# Patient Record
Sex: FEMALE | Race: WHITE | NOT HISPANIC OR LATINO | Employment: FULL TIME | URBAN - METROPOLITAN AREA
[De-identification: names, ages, dates, MRNs, and addresses within clinical notes are randomized per-mention and may not be internally consistent; named-entity substitution may affect disease eponyms.]

---

## 2020-10-20 ENCOUNTER — OFFICE VISIT (OUTPATIENT)
Dept: OBSTETRICS AND GYNECOLOGY | Facility: CLINIC | Age: 25
End: 2020-10-20
Payer: COMMERCIAL

## 2020-10-20 VITALS
WEIGHT: 247.81 LBS | BODY MASS INDEX: 38.89 KG/M2 | HEIGHT: 67 IN | DIASTOLIC BLOOD PRESSURE: 64 MMHG | SYSTOLIC BLOOD PRESSURE: 110 MMHG

## 2020-10-20 DIAGNOSIS — N76.4 VULVAR ABSCESS: Primary | ICD-10-CM

## 2020-10-20 PROCEDURE — 99999 PR PBB SHADOW E&M-NEW PATIENT-LVL III: CPT | Mod: PBBFAC,,, | Performed by: OBSTETRICS & GYNECOLOGY

## 2020-10-20 PROCEDURE — 99203 OFFICE O/P NEW LOW 30 MIN: CPT | Mod: S$GLB,,, | Performed by: OBSTETRICS & GYNECOLOGY

## 2020-10-20 PROCEDURE — 99203 PR OFFICE/OUTPT VISIT, NEW, LEVL III, 30-44 MIN: ICD-10-PCS | Mod: S$GLB,,, | Performed by: OBSTETRICS & GYNECOLOGY

## 2020-10-20 PROCEDURE — 99999 PR PBB SHADOW E&M-NEW PATIENT-LVL III: ICD-10-PCS | Mod: PBBFAC,,, | Performed by: OBSTETRICS & GYNECOLOGY

## 2020-10-20 RX ORDER — TOPIRAMATE 50 MG/1
TABLET, FILM COATED ORAL
COMMUNITY
End: 2021-03-09

## 2020-10-20 NOTE — PROGRESS NOTES
"Subjective:       Madyson Hodge is a 25 y.o. female who presents for follow up from ER visit.   She was seen in the ER for bilateral vaginal cyst/abscess. She is s/p I&D with word cathter placement. She is currently on Bactrim. She admits to vulvar pain controlled with medication.   She desires surgical management for her recurrent Bartholin cyst. States this is the 3rd time she has had an I&D.     The following portions of the patient's history were reviewed and updated as appropriate: allergies, current medications, past family history, past medical history, past social history, past surgical history and problem list.    Review of Systems  Constitutional: negative for fevers  Gastrointestinal: negative for abdominal pain, constipation, diarrhea, nausea and vomiting  Genitourinary:negative for abnormal menstrual periods and vaginal discharge, dysuria and frequency      Objective:      /64   Ht 5' 7" (1.702 m)   Wt 112.4 kg (247 lb 12.8 oz)   LMP 10/01/2020   BMI 38.81 kg/m²    Physical Exam:               Genitourinary:          Pelvic exam was performed with patient supine.   There is tenderness and lesion on the right labia. There is tenderness and lesion on the left labia. Labial bartholins normal.   Genitourinary Comments: Right inferior Labia Major with 2cm swelling with words cathter noted, no surrounding erythema, slight tenderness, slight amount of serous drainage with cathter removal.     Left Superior Labia Majora 1cm swelling with words cathter noted, no surrounding erythema, slight tenderness, no drainage with cathter removal    Both locations are not the typical location for a Bartholin's cyst. They are on the labia mojora, hair baring area. The introitus was normal, with slight swelling in left Bartholin's area.                           Assessment:     Vulvar abscess s/p I&D  Word catheters removed.      Plan:      Suspected vulvar abscess, s/p I&D. Area does not appear infected. " Continue antibiotic course. Refrain from shaving. Keep area clean.   Return to clinic if no improvement.

## 2020-12-03 ENCOUNTER — OFFICE VISIT (OUTPATIENT)
Dept: OBSTETRICS AND GYNECOLOGY | Facility: CLINIC | Age: 25
End: 2020-12-03
Payer: COMMERCIAL

## 2020-12-03 VITALS
WEIGHT: 258.81 LBS | HEIGHT: 67 IN | DIASTOLIC BLOOD PRESSURE: 66 MMHG | BODY MASS INDEX: 40.62 KG/M2 | SYSTOLIC BLOOD PRESSURE: 112 MMHG

## 2020-12-03 DIAGNOSIS — N90.7 VULVAR CYSTS: Primary | ICD-10-CM

## 2020-12-03 PROCEDURE — 99213 PR OFFICE/OUTPT VISIT, EST, LEVL III, 20-29 MIN: ICD-10-PCS | Mod: S$GLB,,, | Performed by: OBSTETRICS & GYNECOLOGY

## 2020-12-03 PROCEDURE — 99213 OFFICE O/P EST LOW 20 MIN: CPT | Mod: S$GLB,,, | Performed by: OBSTETRICS & GYNECOLOGY

## 2020-12-03 PROCEDURE — 99999 PR PBB SHADOW E&M-EST. PATIENT-LVL III: ICD-10-PCS | Mod: PBBFAC,,, | Performed by: OBSTETRICS & GYNECOLOGY

## 2020-12-03 PROCEDURE — 99999 PR PBB SHADOW E&M-EST. PATIENT-LVL III: CPT | Mod: PBBFAC,,, | Performed by: OBSTETRICS & GYNECOLOGY

## 2020-12-03 NOTE — PROGRESS NOTES
"Subjective:       Madyson Hodge is a 25 y.o. female who presents for follow up for recurrent vulvar cyst.  She has been seen in the ER for bilateral vaginal cyst/abscess. She is s/p several I&D with word cathter placement in the past.   At last visit, I did not feel the location was consistent with Batholin's cyst and removed the word cathter.   Patient states the cyst resolved but have now returned.   Slight discomfort. Denies fever. Denies drainage.     The following portions of the patient's history were reviewed and updated as appropriate: allergies, current medications, past family history, past medical history, past social history, past surgical history and problem list.    Review of Systems  Constitutional: negative for fevers  Gastrointestinal: negative for abdominal pain, constipation, diarrhea, nausea and vomiting  Genitourinary:negative for abnormal menstrual periods and vaginal discharge, dysuria and frequency      Objective:      /66   Ht 5' 7" (1.702 m)   Wt 117.4 kg (258 lb 13.1 oz)   BMI 40.54 kg/m²      Physical Exam:               Genitourinary:                           Assessment:     Vulvar cysts     Plan:     Vulvar cysts  -     Ambulatory referral/consult to Urogynecology; Future; Expected date: 12/10/2020  -     CT Pelvis W Wo  Contrast; Future; Expected date: 12/03/2020    Refrain from shaving. Keep area clean.   DDx: atypical bartholin's, vulvar hernia, other anomaly, vs abscess  Will get CT scan and refer to UroGyn for further evaluation.       "

## 2021-03-02 ENCOUNTER — TELEPHONE (OUTPATIENT)
Dept: NEUROLOGY | Facility: CLINIC | Age: 26
End: 2021-03-02

## 2021-03-09 ENCOUNTER — TELEPHONE (OUTPATIENT)
Dept: NEUROLOGY | Facility: CLINIC | Age: 26
End: 2021-03-09

## 2021-03-09 ENCOUNTER — OFFICE VISIT (OUTPATIENT)
Dept: NEUROLOGY | Facility: CLINIC | Age: 26
End: 2021-03-09
Payer: COMMERCIAL

## 2021-03-09 VITALS
BODY MASS INDEX: 44.91 KG/M2 | WEIGHT: 279.44 LBS | RESPIRATION RATE: 20 BRPM | SYSTOLIC BLOOD PRESSURE: 120 MMHG | DIASTOLIC BLOOD PRESSURE: 73 MMHG | TEMPERATURE: 98 F | HEART RATE: 70 BPM | HEIGHT: 66 IN

## 2021-03-09 DIAGNOSIS — G40.209 LOCALIZATION-RELATED (FOCAL) (PARTIAL) SYMPTOMATIC EPILEPSY AND EPILEPTIC SYNDROMES WITH COMPLEX PARTIAL SEIZURES, NOT INTRACTABLE, WITHOUT STATUS EPILEPTICUS: Primary | ICD-10-CM

## 2021-03-09 DIAGNOSIS — G93.9 BRAIN LESION: ICD-10-CM

## 2021-03-09 PROCEDURE — 99999 PR PBB SHADOW E&M-EST. PATIENT-LVL IV: ICD-10-PCS | Mod: PBBFAC,,, | Performed by: PSYCHIATRY & NEUROLOGY

## 2021-03-09 PROCEDURE — 99205 PR OFFICE/OUTPT VISIT, NEW, LEVL V, 60-74 MIN: ICD-10-PCS | Mod: S$GLB,,, | Performed by: PSYCHIATRY & NEUROLOGY

## 2021-03-09 PROCEDURE — 99205 OFFICE O/P NEW HI 60 MIN: CPT | Mod: S$GLB,,, | Performed by: PSYCHIATRY & NEUROLOGY

## 2021-03-09 PROCEDURE — 99999 PR PBB SHADOW E&M-EST. PATIENT-LVL IV: CPT | Mod: PBBFAC,,, | Performed by: PSYCHIATRY & NEUROLOGY

## 2021-03-09 RX ORDER — ZONISAMIDE 100 MG/1
400 CAPSULE ORAL NIGHTLY
Qty: 120 CAPSULE | Refills: 11 | Status: SHIPPED | OUTPATIENT
Start: 2021-03-09 | End: 2021-03-26

## 2021-03-17 ENCOUNTER — TELEPHONE (OUTPATIENT)
Dept: NEUROLOGY | Facility: CLINIC | Age: 26
End: 2021-03-17

## 2021-03-18 ENCOUNTER — TELEPHONE (OUTPATIENT)
Dept: NEUROLOGY | Facility: CLINIC | Age: 26
End: 2021-03-18

## 2021-03-25 ENCOUNTER — TELEPHONE (OUTPATIENT)
Dept: NEUROLOGY | Facility: CLINIC | Age: 26
End: 2021-03-25

## 2021-03-25 DIAGNOSIS — G40.209 LOCALIZATION-RELATED (FOCAL) (PARTIAL) SYMPTOMATIC EPILEPSY AND EPILEPTIC SYNDROMES WITH COMPLEX PARTIAL SEIZURES, NOT INTRACTABLE, WITHOUT STATUS EPILEPTICUS: Primary | ICD-10-CM

## 2021-03-26 RX ORDER — LAMOTRIGINE 100 MG/1
100 TABLET ORAL 2 TIMES DAILY
Qty: 60 TABLET | Refills: 11 | Status: SHIPPED | OUTPATIENT
Start: 2021-03-26 | End: 2022-01-06 | Stop reason: SDUPTHER

## 2021-03-26 RX ORDER — LAMOTRIGINE 25 MG/1
TABLET ORAL
Qty: 168 TABLET | Refills: 0 | Status: SHIPPED | OUTPATIENT
Start: 2021-03-26 | End: 2021-09-15

## 2021-03-31 ENCOUNTER — TELEPHONE (OUTPATIENT)
Dept: NEUROLOGY | Facility: CLINIC | Age: 26
End: 2021-03-31

## 2021-04-06 ENCOUNTER — TELEPHONE (OUTPATIENT)
Dept: NEUROLOGY | Facility: CLINIC | Age: 26
End: 2021-04-06

## 2021-04-07 ENCOUNTER — TELEPHONE (OUTPATIENT)
Dept: NEUROLOGY | Facility: CLINIC | Age: 26
End: 2021-04-07

## 2021-04-08 ENCOUNTER — TELEPHONE (OUTPATIENT)
Dept: NEUROLOGY | Facility: CLINIC | Age: 26
End: 2021-04-08

## 2021-04-27 ENCOUNTER — OFFICE VISIT (OUTPATIENT)
Dept: OBSTETRICS AND GYNECOLOGY | Facility: CLINIC | Age: 26
End: 2021-04-27
Payer: COMMERCIAL

## 2021-04-27 VITALS
SYSTOLIC BLOOD PRESSURE: 94 MMHG | BODY MASS INDEX: 44.72 KG/M2 | WEIGHT: 278.25 LBS | DIASTOLIC BLOOD PRESSURE: 60 MMHG | HEIGHT: 66 IN

## 2021-04-27 DIAGNOSIS — Z87.42 HISTORY OF OVARIAN CYST: Primary | ICD-10-CM

## 2021-04-27 DIAGNOSIS — Z31.89 ENCOUNTER FOR FERTILITY PLANNING: ICD-10-CM

## 2021-04-27 PROCEDURE — 99999 PR PBB SHADOW E&M-EST. PATIENT-LVL III: CPT | Mod: PBBFAC,,, | Performed by: OBSTETRICS & GYNECOLOGY

## 2021-04-27 PROCEDURE — 99213 OFFICE O/P EST LOW 20 MIN: CPT | Mod: S$GLB,,, | Performed by: OBSTETRICS & GYNECOLOGY

## 2021-04-27 PROCEDURE — 99999 PR PBB SHADOW E&M-EST. PATIENT-LVL III: ICD-10-PCS | Mod: PBBFAC,,, | Performed by: OBSTETRICS & GYNECOLOGY

## 2021-04-27 PROCEDURE — 99213 PR OFFICE/OUTPT VISIT, EST, LEVL III, 20-29 MIN: ICD-10-PCS | Mod: S$GLB,,, | Performed by: OBSTETRICS & GYNECOLOGY

## 2021-04-30 ENCOUNTER — TELEPHONE (OUTPATIENT)
Dept: NEUROLOGY | Facility: CLINIC | Age: 26
End: 2021-04-30

## 2021-04-30 DIAGNOSIS — C71.9 LOW GRADE GLIOMA OF BRAIN: ICD-10-CM

## 2021-04-30 DIAGNOSIS — D49.6 BRAIN TUMOR: Primary | ICD-10-CM

## 2021-05-10 ENCOUNTER — PATIENT MESSAGE (OUTPATIENT)
Dept: RESEARCH | Facility: HOSPITAL | Age: 26
End: 2021-05-10

## 2021-05-27 ENCOUNTER — TELEPHONE (OUTPATIENT)
Dept: NEUROLOGY | Facility: CLINIC | Age: 26
End: 2021-05-27

## 2021-05-28 ENCOUNTER — TELEPHONE (OUTPATIENT)
Dept: NEUROLOGY | Facility: CLINIC | Age: 26
End: 2021-05-28

## 2021-05-28 NOTE — TELEPHONE ENCOUNTER
Spoke with Lauren at [facility] and requested patient MRI disk and report. I faxed over a medical release with Dr. Boateng name and address on it so the disk and report can be sent to us.

## 2021-06-09 ENCOUNTER — TELEPHONE (OUTPATIENT)
Dept: RADIOLOGY | Facility: HOSPITAL | Age: 26
End: 2021-06-09

## 2021-09-15 ENCOUNTER — OFFICE VISIT (OUTPATIENT)
Dept: NEUROLOGY | Facility: CLINIC | Age: 26
End: 2021-09-15
Payer: COMMERCIAL

## 2021-09-15 ENCOUNTER — TELEPHONE (OUTPATIENT)
Dept: NEUROLOGY | Facility: CLINIC | Age: 26
End: 2021-09-15

## 2021-09-15 VITALS
RESPIRATION RATE: 20 BRPM | TEMPERATURE: 98 F | HEIGHT: 66 IN | SYSTOLIC BLOOD PRESSURE: 114 MMHG | HEART RATE: 68 BPM | DIASTOLIC BLOOD PRESSURE: 79 MMHG | BODY MASS INDEX: 43.38 KG/M2 | WEIGHT: 269.94 LBS

## 2021-09-15 DIAGNOSIS — C71.9 LOW GRADE GLIOMA OF BRAIN: ICD-10-CM

## 2021-09-15 DIAGNOSIS — G40.209 LOCALIZATION-RELATED (FOCAL) (PARTIAL) SYMPTOMATIC EPILEPSY AND EPILEPTIC SYNDROMES WITH COMPLEX PARTIAL SEIZURES, NOT INTRACTABLE, WITHOUT STATUS EPILEPTICUS: Primary | ICD-10-CM

## 2021-09-15 PROCEDURE — 99999 PR PBB SHADOW E&M-EST. PATIENT-LVL IV: ICD-10-PCS | Mod: PBBFAC,,, | Performed by: PSYCHIATRY & NEUROLOGY

## 2021-09-15 PROCEDURE — 99999 PR PBB SHADOW E&M-EST. PATIENT-LVL IV: CPT | Mod: PBBFAC,,, | Performed by: PSYCHIATRY & NEUROLOGY

## 2021-09-15 PROCEDURE — 99215 PR OFFICE/OUTPT VISIT, EST, LEVL V, 40-54 MIN: ICD-10-PCS | Mod: S$GLB,,, | Performed by: PSYCHIATRY & NEUROLOGY

## 2021-09-15 PROCEDURE — 99215 OFFICE O/P EST HI 40 MIN: CPT | Mod: S$GLB,,, | Performed by: PSYCHIATRY & NEUROLOGY

## 2021-09-15 RX ORDER — GABAPENTIN 300 MG/1
300 CAPSULE ORAL DAILY
Qty: 30 CAPSULE | Refills: 11 | Status: SHIPPED | OUTPATIENT
Start: 2021-09-15 | End: 2022-01-06 | Stop reason: SDUPTHER

## 2021-11-09 ENCOUNTER — TELEPHONE (OUTPATIENT)
Dept: NEUROLOGY | Facility: CLINIC | Age: 26
End: 2021-11-09
Payer: MEDICAID

## 2021-11-09 ENCOUNTER — PATIENT MESSAGE (OUTPATIENT)
Dept: NEUROLOGY | Facility: CLINIC | Age: 26
End: 2021-11-09
Payer: MEDICAID

## 2021-12-10 ENCOUNTER — TELEPHONE (OUTPATIENT)
Dept: NEUROLOGY | Facility: CLINIC | Age: 26
End: 2021-12-10
Payer: MEDICAID

## 2021-12-10 ENCOUNTER — PATIENT MESSAGE (OUTPATIENT)
Dept: NEUROLOGY | Facility: CLINIC | Age: 26
End: 2021-12-10
Payer: MEDICAID

## 2022-01-06 ENCOUNTER — OFFICE VISIT (OUTPATIENT)
Dept: NEUROLOGY | Facility: CLINIC | Age: 27
End: 2022-01-06
Payer: MEDICAID

## 2022-01-06 VITALS
WEIGHT: 258.19 LBS | DIASTOLIC BLOOD PRESSURE: 58 MMHG | RESPIRATION RATE: 18 BRPM | HEART RATE: 72 BPM | TEMPERATURE: 98 F | BODY MASS INDEX: 41.67 KG/M2 | SYSTOLIC BLOOD PRESSURE: 110 MMHG

## 2022-01-06 DIAGNOSIS — G40.209 LOCALIZATION-RELATED (FOCAL) (PARTIAL) SYMPTOMATIC EPILEPSY AND EPILEPTIC SYNDROMES WITH COMPLEX PARTIAL SEIZURES, NOT INTRACTABLE, WITHOUT STATUS EPILEPTICUS: Primary | ICD-10-CM

## 2022-01-06 DIAGNOSIS — C71.9 LOW GRADE GLIOMA OF BRAIN: ICD-10-CM

## 2022-01-06 PROCEDURE — 99999 PR PBB SHADOW E&M-EST. PATIENT-LVL IV: ICD-10-PCS | Mod: PBBFAC,,, | Performed by: PSYCHIATRY & NEUROLOGY

## 2022-01-06 PROCEDURE — 99214 OFFICE O/P EST MOD 30 MIN: CPT | Mod: PBBFAC,PO | Performed by: PSYCHIATRY & NEUROLOGY

## 2022-01-06 PROCEDURE — 3078F DIAST BP <80 MM HG: CPT | Mod: CPTII,,, | Performed by: PSYCHIATRY & NEUROLOGY

## 2022-01-06 PROCEDURE — 99215 PR OFFICE/OUTPT VISIT, EST, LEVL V, 40-54 MIN: ICD-10-PCS | Mod: S$PBB,,, | Performed by: PSYCHIATRY & NEUROLOGY

## 2022-01-06 PROCEDURE — 3008F BODY MASS INDEX DOCD: CPT | Mod: CPTII,,, | Performed by: PSYCHIATRY & NEUROLOGY

## 2022-01-06 PROCEDURE — 99999 PR PBB SHADOW E&M-EST. PATIENT-LVL IV: CPT | Mod: PBBFAC,,, | Performed by: PSYCHIATRY & NEUROLOGY

## 2022-01-06 PROCEDURE — 1159F PR MEDICATION LIST DOCUMENTED IN MEDICAL RECORD: ICD-10-PCS | Mod: CPTII,,, | Performed by: PSYCHIATRY & NEUROLOGY

## 2022-01-06 PROCEDURE — 3078F PR MOST RECENT DIASTOLIC BLOOD PRESSURE < 80 MM HG: ICD-10-PCS | Mod: CPTII,,, | Performed by: PSYCHIATRY & NEUROLOGY

## 2022-01-06 PROCEDURE — 1159F MED LIST DOCD IN RCRD: CPT | Mod: CPTII,,, | Performed by: PSYCHIATRY & NEUROLOGY

## 2022-01-06 PROCEDURE — 3008F PR BODY MASS INDEX (BMI) DOCUMENTED: ICD-10-PCS | Mod: CPTII,,, | Performed by: PSYCHIATRY & NEUROLOGY

## 2022-01-06 PROCEDURE — 1160F RVW MEDS BY RX/DR IN RCRD: CPT | Mod: CPTII,,, | Performed by: PSYCHIATRY & NEUROLOGY

## 2022-01-06 PROCEDURE — 99215 OFFICE O/P EST HI 40 MIN: CPT | Mod: S$PBB,,, | Performed by: PSYCHIATRY & NEUROLOGY

## 2022-01-06 PROCEDURE — 3074F PR MOST RECENT SYSTOLIC BLOOD PRESSURE < 130 MM HG: ICD-10-PCS | Mod: CPTII,,, | Performed by: PSYCHIATRY & NEUROLOGY

## 2022-01-06 PROCEDURE — 1160F PR REVIEW ALL MEDS BY PRESCRIBER/CLIN PHARMACIST DOCUMENTED: ICD-10-PCS | Mod: CPTII,,, | Performed by: PSYCHIATRY & NEUROLOGY

## 2022-01-06 PROCEDURE — 3074F SYST BP LT 130 MM HG: CPT | Mod: CPTII,,, | Performed by: PSYCHIATRY & NEUROLOGY

## 2022-01-06 RX ORDER — LAMOTRIGINE 200 MG/1
200 TABLET ORAL 2 TIMES DAILY
Qty: 60 TABLET | Refills: 11 | Status: SHIPPED | OUTPATIENT
Start: 2022-01-06 | End: 2022-07-06 | Stop reason: SDUPTHER

## 2022-01-06 RX ORDER — GABAPENTIN 300 MG/1
CAPSULE ORAL
Qty: 90 CAPSULE | Refills: 11 | Status: SHIPPED | OUTPATIENT
Start: 2022-01-06 | End: 2022-07-06 | Stop reason: SDUPTHER
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
## 2022-01-13 ENCOUNTER — LAB VISIT (OUTPATIENT)
Dept: LAB | Facility: HOSPITAL | Age: 27
End: 2022-01-13
Attending: PSYCHIATRY & NEUROLOGY
Payer: MEDICAID

## 2022-01-13 ENCOUNTER — OFFICE VISIT (OUTPATIENT)
Dept: NEUROLOGY | Facility: CLINIC | Age: 27
End: 2022-01-13
Payer: MEDICAID

## 2022-01-13 VITALS
WEIGHT: 273.13 LBS | HEART RATE: 62 BPM | SYSTOLIC BLOOD PRESSURE: 114 MMHG | BODY MASS INDEX: 43.9 KG/M2 | RESPIRATION RATE: 17 BRPM | HEIGHT: 66 IN | DIASTOLIC BLOOD PRESSURE: 74 MMHG | TEMPERATURE: 99 F

## 2022-01-13 DIAGNOSIS — C71.9 LOW GRADE GLIOMA OF BRAIN: ICD-10-CM

## 2022-01-13 DIAGNOSIS — G44.40 REBOUND HEADACHE: ICD-10-CM

## 2022-01-13 DIAGNOSIS — G47.9 TROUBLE IN SLEEPING: ICD-10-CM

## 2022-01-13 DIAGNOSIS — M79.18 MYOFASCIAL PAIN: ICD-10-CM

## 2022-01-13 DIAGNOSIS — G43.709 CHRONIC MIGRAINE WITHOUT AURA WITHOUT STATUS MIGRAINOSUS, NOT INTRACTABLE: Primary | ICD-10-CM

## 2022-01-13 DIAGNOSIS — G40.209 LOCALIZATION-RELATED (FOCAL) (PARTIAL) SYMPTOMATIC EPILEPSY AND EPILEPTIC SYNDROMES WITH COMPLEX PARTIAL SEIZURES, NOT INTRACTABLE, WITHOUT STATUS EPILEPTICUS: ICD-10-CM

## 2022-01-13 LAB
ALBUMIN SERPL BCP-MCNC: 4 G/DL (ref 3.5–5.2)
ALP SERPL-CCNC: 102 U/L (ref 55–135)
ALT SERPL W/O P-5'-P-CCNC: 11 U/L (ref 10–44)
ANION GAP SERPL CALC-SCNC: 6 MMOL/L (ref 8–16)
AST SERPL-CCNC: 15 U/L (ref 10–40)
BILIRUB SERPL-MCNC: 0.7 MG/DL (ref 0.1–1)
BUN SERPL-MCNC: 15 MG/DL (ref 6–20)
CALCIUM SERPL-MCNC: 10 MG/DL (ref 8.7–10.5)
CHLORIDE SERPL-SCNC: 108 MMOL/L (ref 95–110)
CO2 SERPL-SCNC: 24 MMOL/L (ref 23–29)
CREAT SERPL-MCNC: 0.8 MG/DL (ref 0.5–1.4)
EST. GFR  (AFRICAN AMERICAN): >60 ML/MIN/1.73 M^2
EST. GFR  (NON AFRICAN AMERICAN): >60 ML/MIN/1.73 M^2
GLUCOSE SERPL-MCNC: 85 MG/DL (ref 70–110)
POTASSIUM SERPL-SCNC: 4.6 MMOL/L (ref 3.5–5.1)
PROT SERPL-MCNC: 7.2 G/DL (ref 6–8.4)
SODIUM SERPL-SCNC: 138 MMOL/L (ref 136–145)

## 2022-01-13 PROCEDURE — 1159F PR MEDICATION LIST DOCUMENTED IN MEDICAL RECORD: ICD-10-PCS | Mod: CPTII,,, | Performed by: PHYSICIAN ASSISTANT

## 2022-01-13 PROCEDURE — 3074F SYST BP LT 130 MM HG: CPT | Mod: CPTII,,, | Performed by: PHYSICIAN ASSISTANT

## 2022-01-13 PROCEDURE — 3008F BODY MASS INDEX DOCD: CPT | Mod: CPTII,,, | Performed by: PHYSICIAN ASSISTANT

## 2022-01-13 PROCEDURE — 36415 COLL VENOUS BLD VENIPUNCTURE: CPT | Mod: PO | Performed by: PSYCHIATRY & NEUROLOGY

## 2022-01-13 PROCEDURE — 3074F PR MOST RECENT SYSTOLIC BLOOD PRESSURE < 130 MM HG: ICD-10-PCS | Mod: CPTII,,, | Performed by: PHYSICIAN ASSISTANT

## 2022-01-13 PROCEDURE — 99215 OFFICE O/P EST HI 40 MIN: CPT | Mod: S$PBB,,, | Performed by: PHYSICIAN ASSISTANT

## 2022-01-13 PROCEDURE — 3008F PR BODY MASS INDEX (BMI) DOCUMENTED: ICD-10-PCS | Mod: CPTII,,, | Performed by: PHYSICIAN ASSISTANT

## 2022-01-13 PROCEDURE — 1159F MED LIST DOCD IN RCRD: CPT | Mod: CPTII,,, | Performed by: PHYSICIAN ASSISTANT

## 2022-01-13 PROCEDURE — 80053 COMPREHEN METABOLIC PANEL: CPT | Performed by: PSYCHIATRY & NEUROLOGY

## 2022-01-13 PROCEDURE — 99999 PR PBB SHADOW E&M-EST. PATIENT-LVL IV: CPT | Mod: PBBFAC,,, | Performed by: PHYSICIAN ASSISTANT

## 2022-01-13 PROCEDURE — 99214 OFFICE O/P EST MOD 30 MIN: CPT | Mod: PBBFAC,PO | Performed by: PHYSICIAN ASSISTANT

## 2022-01-13 PROCEDURE — 1160F RVW MEDS BY RX/DR IN RCRD: CPT | Mod: CPTII,,, | Performed by: PHYSICIAN ASSISTANT

## 2022-01-13 PROCEDURE — 99215 PR OFFICE/OUTPT VISIT, EST, LEVL V, 40-54 MIN: ICD-10-PCS | Mod: S$PBB,,, | Performed by: PHYSICIAN ASSISTANT

## 2022-01-13 PROCEDURE — 3078F DIAST BP <80 MM HG: CPT | Mod: CPTII,,, | Performed by: PHYSICIAN ASSISTANT

## 2022-01-13 PROCEDURE — 80175 DRUG SCREEN QUAN LAMOTRIGINE: CPT | Performed by: PSYCHIATRY & NEUROLOGY

## 2022-01-13 PROCEDURE — 99999 PR PBB SHADOW E&M-EST. PATIENT-LVL IV: ICD-10-PCS | Mod: PBBFAC,,, | Performed by: PHYSICIAN ASSISTANT

## 2022-01-13 PROCEDURE — 3078F PR MOST RECENT DIASTOLIC BLOOD PRESSURE < 80 MM HG: ICD-10-PCS | Mod: CPTII,,, | Performed by: PHYSICIAN ASSISTANT

## 2022-01-13 PROCEDURE — 1160F PR REVIEW ALL MEDS BY PRESCRIBER/CLIN PHARMACIST DOCUMENTED: ICD-10-PCS | Mod: CPTII,,, | Performed by: PHYSICIAN ASSISTANT

## 2022-01-13 RX ORDER — TIZANIDINE 2 MG/1
TABLET ORAL
Qty: 60 TABLET | Refills: 4 | Status: SHIPPED | OUTPATIENT
Start: 2022-01-13 | End: 2023-09-15

## 2022-01-13 RX ORDER — IBUPROFEN 100 MG/1
TABLET, CHEWABLE ORAL
COMMUNITY
End: 2023-09-15

## 2022-01-13 RX ORDER — HYDROXYZINE PAMOATE 25 MG/1
CAPSULE ORAL
Qty: 15 CAPSULE | Refills: 1 | Status: SHIPPED | OUTPATIENT
Start: 2022-01-13 | End: 2023-09-15

## 2022-01-13 NOTE — PATIENT INSTRUCTIONS
"      To reduce headaches:  PT ordered  Try the zanaflex (tizanadine) 1 pill approx 2 hours before bed for a week, if after a week, no benefits/no side effects, move up to 2 pills    For headache :  Limit over the counter medicine to <3 days use in week ("white rabbit")    To "factory reset"  Try the vistaril (hydroxyzine) 1 pill 3x a day for 2 days, rest left over every 8 hours as needed for bad headaches, can cause sedation/no driving     "

## 2022-01-14 ENCOUNTER — TELEPHONE (OUTPATIENT)
Dept: NEUROLOGY | Facility: CLINIC | Age: 27
End: 2022-01-14
Payer: MEDICAID

## 2022-01-14 NOTE — TELEPHONE ENCOUNTER
Call returned to Pt to in regards to EEG. Pt states insurance is is still denying payment for it but she has gotten in touch with payment assistance and they will help her to cover the cost. Procedure has been rescheduled but will possibly be rescheduled again d/t her daughter having covid.

## 2022-01-14 NOTE — TELEPHONE ENCOUNTER
----- Message from Marisela Trevino sent at 1/14/2022  2:00 PM CST -----  Regarding: advice  Contact: patient  Type: Needs Medical Advice  Who Called:  patient  Best Call Back Number: [phone] (home)   Additional Information: Patient is trying to get the EEG but insurance is not approving it. Please call patient to advise. Thanks!

## 2022-01-14 NOTE — TELEPHONE ENCOUNTER
Call placed to Pt to advise that EEG scheduled for 01/17/22 @ 9am is not covered by insurance.  Pt informed that she can request self pay for assistance with covering the cost. Number provided to [facility] sleep study and [facility] billing. Pt verbalized understanding.

## 2022-01-17 LAB — LAMOTRIGINE SERPL-MCNC: 5.2 UG/ML (ref 2–15)

## 2022-01-18 ENCOUNTER — TELEPHONE (OUTPATIENT)
Dept: NEUROLOGY | Facility: CLINIC | Age: 27
End: 2022-01-18
Payer: MEDICAID

## 2022-01-18 NOTE — TELEPHONE ENCOUNTER
----- Message from Elis Rm MD sent at 1/18/2022  8:19 AM CST -----  The lamictal level looks good but does show some room to increase further if more seizures occur. Let us know if you have more seizure activity.

## 2022-01-18 NOTE — TELEPHONE ENCOUNTER
Call placed to Pt to review lab results. Per , the lamictal level looks good but does show some room to increase further if more seizures occur. No answer, unable to leave voicemail.

## 2022-02-08 ENCOUNTER — CLINICAL SUPPORT (OUTPATIENT)
Dept: REHABILITATION | Facility: HOSPITAL | Age: 27
End: 2022-02-08
Payer: MEDICAID

## 2022-02-08 DIAGNOSIS — M79.18 MYOFASCIAL PAIN: ICD-10-CM

## 2022-02-08 DIAGNOSIS — G43.709 CHRONIC MIGRAINE WITHOUT AURA WITHOUT STATUS MIGRAINOSUS, NOT INTRACTABLE: ICD-10-CM

## 2022-02-08 DIAGNOSIS — G44.40 REBOUND HEADACHE: ICD-10-CM

## 2022-02-08 PROCEDURE — 97110 THERAPEUTIC EXERCISES: CPT | Mod: PO | Performed by: PHYSICAL THERAPIST

## 2022-02-08 PROCEDURE — 97140 MANUAL THERAPY 1/> REGIONS: CPT | Mod: PO | Performed by: PHYSICAL THERAPIST

## 2022-02-08 PROCEDURE — 97162 PT EVAL MOD COMPLEX 30 MIN: CPT | Mod: PO | Performed by: PHYSICAL THERAPIST

## 2022-02-08 NOTE — PROGRESS NOTES
See carrington.  Thanks for Consult.      Calin Esparza PT, DPT  Board Certified Clinical Specialist in Orthopaedic Physical Therapy  [facility]

## 2022-02-08 NOTE — PLAN OF CARE
[facility]  Physical Therapy Initial Evaluation    Name: Maydson Hodge  Clinic Number: [id number]    Therapy Diagnosis:   Encounter Diagnoses   Name Primary?    Chronic migraine without aura without status migrainosus, not intractable     Myofascial pain     Rebound headache      Physician: Con Arroyo, EVELIO    Physician Orders: PT Eval and Treat  Medical Diagnosis from Referral: Chronic migraine without aura  Evaluation Date: 2/8/2022  Authorization Period Expiration: 1/13/2023  Plan of Care Expiration: 5/3/2022  Progress Note Due: 3/8/2022  Visit # / Visits authorized: 1/ 1   FOTO: 1/3    Time In: 9:00  Time Out: 10:00  Total Billable Time: 60 minutes    Precautions: Standard    Subjective   Date of onset: 8 years ago  History of current condition - Kathryn reports she was diagnosed around 8 years ago with a seizure disorder and an MRI identified a small lesion in her brain that has been followed periodically for change since then. She gets headaches 6 days per week and they usually last almost every day. She takes medication for her seizures that she says often make her sleepy and that her previous meds caused her migraines to worsen. She feels that her headaches are definitely worsened from her muscle tension.     Medical History:   Past Medical History:   Diagnosis Date    Asthma     Headache     Seizures        Surgical History:   Madyson Hodge  has a past surgical history that includes Ovarian cyst drainage.    Medications:   Madyson has a current medication list which includes the following prescription(s): gabapentin, hydroxyzine pamoate, ibuprofen, lamotrigine, and tizanidine, and the following Facility-Administered Medications: acetaminophen, diphenhydramine, epinephrine, and ondansetron.    Allergies:   Review of patient's allergies indicates:  No Known Allergies     Imaging, MRI studies: results not available    Prior Therapy: None  Social History:  lives with  their family  Occupation: Unemployed  Prior Level of Function: Chronic  Current Level of Function: Headaches 6 days per week cause significant disability    Pain:  Current 5/10, worst 10/10, best 4/10   Location: bilateral head  and neck   Description: Aching, Dull, Burning, Throbbing and Tight  Aggravating Factors: unknown  Easing Factors: sleep    Pts goals: Improve pain    Red Flag Screening:   Cough  Sneeze  Strain: (--)  Bladder/ bowel: (--)  Falls: (--)  Night pain: (--)  Unexplained weight loss: (--)  General health: Good      Objective   Posture: Moderate forward head posture    Cervical Range of Motion:    % Limitation Pain   Flexion 25 yes     Extension 30% yes     Right Rotation 40 yes     Left Rotation 40 yes     Right Side Bending 50 yes   Left Side Bending 50 yes      Strength:  Cervical MMT   Flexion 4-/5   Extension 4-/5   Right Side Bend 4-/5   Left Side Bend 4-/5     Upper Extremity Strength  (R) UE  (L) UE    Lower Trap 4-/5 Lower Trap 4-/5   Middle Trap 4-/5 Middle Trap 4-/5   Rhomboids 4-/5 Rhomboids 4-/5   Serratus Anterior 4-/5 Serratus Anterior 4-/5     Special Tests:  DNF Endurance test 10 seconds     Joint Mobility: not performed due to pain    Thoracic mobility: hypomobile throughout, most pronounced T1-6    Palpation Tenderness: bilateral upper trapezius, bilateral sternocleidomastoid, and bilateral suboccipitals; all palpation recreated familiar headache       CMS Impairment/Limitation/Restriction for FOTO neck Survey    Therapist reviewed FOTO scores for Madyson Hodge on 2/8/2022.   FOTO documents entered into [employer] - see Media section.    Limitation Score: 30%       TREATMENT   Treatment Time In: 9:25  Treatment Time Out: 10:00  Total Treatment time separate from Evaluation: 35 minutes    Kathryn received therapeutic exercises to develop strength, endurance, ROM and flexibility for 10 minutes including:  Shoulder rows 3x10 (blue)  Open books leaning on table 2x10    Kathryn received the  following manual therapy techniques: Joint mobilizations, Myofacial release and Soft tissue Mobilization were applied to the: cervical spine for 25 minutes, including:  FDN bilateral upper trapezius; supine thoracic manipulation; cervical STM      Home Exercises and Patient Education Provided    Education provided:   - Role of PT, PT POC    Written Home Exercises Provided: yes.  Exercises were reviewed and Kathryn was able to demonstrate them prior to the end of the session.  Kathryn demonstrated good  understanding of the education provided.     See EMR under Patient Instructions for exercises provided 2/8/2022.    Assessment   Patient presents with signs and symptoms consistent with a diagnosis of chronic migraine's with associated myofascial pain including all above listed objective impairments. It appears that the patients most significant impairments contributing to their diagnosis are decreased thoracic and cervical mobility with associated postural strength deficits. This pt is a good candidate for skilled PT treatment and stands to benefit from a combination of manual therapy, therapeutic exercise/actvities, neuromuscular re-education, and modalities Prn. The pt has been educated on their dx/POC and consents to further PT treatment.    Pt prognosis is Good.   Pt will benefit from skilled outpatient Physical Therapy to address the deficits stated above and in the chart below, provide pt/family education, and to maximize pt's level of independence.     Plan of care discussed with patient: Yes  Pt's spiritual, cultural and educational needs considered and patient is agreeable to the plan of care and goals as stated below:     Anticipated Barriers for therapy: None    Medical Necessity is demonstrated by the following  History  Co-morbidities and personal factors that may impact the plan of care Co-morbidities:   asthma, headache, seizures    Personal Factors:   coping style  lifestyle     moderate   Examination  Body  Structures and Functions, activity limitations and participation restrictions that may impact the plan of care Body Regions:   head  neck    Body Systems:    gross symmetry  ROM  strength  gross coordinated movement    Participation Restrictions:   Pain with ADL    Activity limitations:   Learning and applying knowledge  no deficits    General Tasks and Commands  no deficits    Communication  no deficits    Mobility  no deficits    Self care  no deficits    Domestic Life  no deficits    Interactions/Relationships  no deficits    Life Areas  basic economic transactions    Community and Social Life  no deficits         moderate   Clinical Presentation evolving clinical presentation with changing clinical characteristics moderate   Decision Making/ Complexity Score: moderate     Goals:  Short Term Goals (4 Weeks):   1) Pt will demonstrate compliance with initial home exercise program as prescribed by physical therapist to improve independence with management of condition.  2) Pt to report cervical pain of <7/10 at worst to improve tolerance to ADLs and HRQOL.  3) Pt to demonstrate improved isometric hold time on deep neck flexor endurance test by 5s to demonstrate improving postural strength.    Long Term Goals (8 Weeks):   1) Pt to achieve <24% limitation as measured by the FOTO to demonstrate decreased disability.  2) Pt to report cervical pain of <4/10 at worst to improve tolerance to ADLs and HRQOL.  3) Pt to increase strength of muscles tested to at least 4+/5 to demonstrate improved postural strength for maintaining sustained postural positions.  4) Pt demonstrate improved isometric hold time on deep neck flexor endurance test by 15s to demonstrate improving postural strength.    Plan   Plan of care Certification: 2/8/2022 to 5/3/2022.    Outpatient Physical Therapy 2 times weekly for 8 weeks to include the following interventions: Manual Therapy, Neuromuscular Re-ed, Therapeutic Activities and Therapeutic  Exercise.     Calin Esparza, PT

## 2022-03-16 ENCOUNTER — PATIENT MESSAGE (OUTPATIENT)
Dept: NEUROLOGY | Facility: CLINIC | Age: 27
End: 2022-03-16
Payer: MEDICAID

## 2022-04-27 ENCOUNTER — TELEPHONE (OUTPATIENT)
Dept: NEUROLOGY | Facility: CLINIC | Age: 27
End: 2022-04-27
Payer: MEDICAID

## 2022-04-28 ENCOUNTER — LAB VISIT (OUTPATIENT)
Dept: LAB | Facility: HOSPITAL | Age: 27
End: 2022-04-28
Attending: PSYCHIATRY & NEUROLOGY
Payer: MEDICAID

## 2022-04-28 ENCOUNTER — OFFICE VISIT (OUTPATIENT)
Dept: NEUROLOGY | Facility: CLINIC | Age: 27
End: 2022-04-28
Payer: MEDICAID

## 2022-04-28 VITALS
DIASTOLIC BLOOD PRESSURE: 72 MMHG | HEIGHT: 66 IN | SYSTOLIC BLOOD PRESSURE: 109 MMHG | WEIGHT: 260 LBS | BODY MASS INDEX: 41.78 KG/M2 | RESPIRATION RATE: 18 BRPM

## 2022-04-28 DIAGNOSIS — R25.3 JERKING: ICD-10-CM

## 2022-04-28 DIAGNOSIS — G43.709 CHRONIC MIGRAINE WITHOUT AURA WITHOUT STATUS MIGRAINOSUS, NOT INTRACTABLE: ICD-10-CM

## 2022-04-28 DIAGNOSIS — G40.209 LOCALIZATION-RELATED (FOCAL) (PARTIAL) SYMPTOMATIC EPILEPSY AND EPILEPTIC SYNDROMES WITH COMPLEX PARTIAL SEIZURES, NOT INTRACTABLE, WITHOUT STATUS EPILEPTICUS: Primary | ICD-10-CM

## 2022-04-28 DIAGNOSIS — C71.9 LOW GRADE GLIOMA OF BRAIN: ICD-10-CM

## 2022-04-28 PROCEDURE — 99213 OFFICE O/P EST LOW 20 MIN: CPT | Mod: PBBFAC,PO | Performed by: PSYCHIATRY & NEUROLOGY

## 2022-04-28 PROCEDURE — 99999 PR PBB SHADOW E&M-EST. PATIENT-LVL III: CPT | Mod: PBBFAC,,, | Performed by: PSYCHIATRY & NEUROLOGY

## 2022-04-28 PROCEDURE — 36415 COLL VENOUS BLD VENIPUNCTURE: CPT | Mod: PO | Performed by: PSYCHIATRY & NEUROLOGY

## 2022-04-28 PROCEDURE — 99999 PR PBB SHADOW E&M-EST. PATIENT-LVL III: ICD-10-PCS | Mod: PBBFAC,,, | Performed by: PSYCHIATRY & NEUROLOGY

## 2022-04-28 PROCEDURE — 1159F PR MEDICATION LIST DOCUMENTED IN MEDICAL RECORD: ICD-10-PCS | Mod: CPTII,,, | Performed by: PSYCHIATRY & NEUROLOGY

## 2022-04-28 PROCEDURE — 3008F PR BODY MASS INDEX (BMI) DOCUMENTED: ICD-10-PCS | Mod: CPTII,,, | Performed by: PSYCHIATRY & NEUROLOGY

## 2022-04-28 PROCEDURE — 1160F PR REVIEW ALL MEDS BY PRESCRIBER/CLIN PHARMACIST DOCUMENTED: ICD-10-PCS | Mod: CPTII,,, | Performed by: PSYCHIATRY & NEUROLOGY

## 2022-04-28 PROCEDURE — 3074F PR MOST RECENT SYSTOLIC BLOOD PRESSURE < 130 MM HG: ICD-10-PCS | Mod: CPTII,,, | Performed by: PSYCHIATRY & NEUROLOGY

## 2022-04-28 PROCEDURE — 3008F BODY MASS INDEX DOCD: CPT | Mod: CPTII,,, | Performed by: PSYCHIATRY & NEUROLOGY

## 2022-04-28 PROCEDURE — 84466 ASSAY OF TRANSFERRIN: CPT | Performed by: PSYCHIATRY & NEUROLOGY

## 2022-04-28 PROCEDURE — 99214 OFFICE O/P EST MOD 30 MIN: CPT | Mod: S$PBB,,, | Performed by: PSYCHIATRY & NEUROLOGY

## 2022-04-28 PROCEDURE — 1159F MED LIST DOCD IN RCRD: CPT | Mod: CPTII,,, | Performed by: PSYCHIATRY & NEUROLOGY

## 2022-04-28 PROCEDURE — 3074F SYST BP LT 130 MM HG: CPT | Mod: CPTII,,, | Performed by: PSYCHIATRY & NEUROLOGY

## 2022-04-28 PROCEDURE — 82728 ASSAY OF FERRITIN: CPT | Performed by: PSYCHIATRY & NEUROLOGY

## 2022-04-28 PROCEDURE — 1160F RVW MEDS BY RX/DR IN RCRD: CPT | Mod: CPTII,,, | Performed by: PSYCHIATRY & NEUROLOGY

## 2022-04-28 PROCEDURE — 3078F DIAST BP <80 MM HG: CPT | Mod: CPTII,,, | Performed by: PSYCHIATRY & NEUROLOGY

## 2022-04-28 PROCEDURE — 3078F PR MOST RECENT DIASTOLIC BLOOD PRESSURE < 80 MM HG: ICD-10-PCS | Mod: CPTII,,, | Performed by: PSYCHIATRY & NEUROLOGY

## 2022-04-28 PROCEDURE — 99214 PR OFFICE/OUTPT VISIT, EST, LEVL IV, 30-39 MIN: ICD-10-PCS | Mod: S$PBB,,, | Performed by: PSYCHIATRY & NEUROLOGY

## 2022-04-28 NOTE — PROGRESS NOTES
"  Date: 4/28/2022    Patient ID: Madyson Hodge is a 26 y.o. female.    Chief Complaint: Seizures      History of Present Illness:  Ms. Hodge is a 26 y.o. 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      Interval history: Routine EEG was normal. MRI brain was obtained in April 2021 which showed left posterior temporooccipital cystic mass with minimal contrast enhancement. I referred her to neuro-oncology. She has been seen by Dr. Marquez. He is planning to monitor with MRI in summer 2022 (June 2022 planned).      She had a seizure while on lamictal 150 mg BID and therefore we increase to 200 mg BID. Lamictal level on this dose was 5.2. She has not had any further seizures.      She is following with headache clinic.  She is taking the gabapentin and her headache frequency has been better. She is having one about once per week. She will take advil to abort the headache.     She jerks while she is trying to go to sleep often.      Prior HPI:   She has history of seizures in 2015. She has MRI. Reviewed in care everywhere and it showed "focal area of architectural distortion at the junction of posterior aspect of gyrus occipital temporalis medialis and gyrus occipital temporal lateralis measuring approx 27.8 x 17 x 21.6 " MR Spectroscopy was obtained and showed "possibility of focal cortical dysplasia with possible assoc with low grade glioneuronal neoplasm cannot be ruled out." She states this was followed without change. She was on keppra at that time and she had headaches at that time on it. She stopped taking it and did well for years seizure-free. She recently had a seizure on 2/24/21. She felt extremely hot before the seizure and then next thing she woke up on the floor. She convulsed for 2-3 minutes and then woke up confused. She was seen at [facility] ER. She was loaded with keppra and sent out from the ER on 1000 mg BID. She had a headache following this seizure. She thinks she " "hit her head during her seizure. She notes that keppra gives her headache and makes her sleepy. She has also been nauseated. She was sleeping well before the seizure. She was drinking multiple red bulls in a 12 hour shift. She was working night shift.       No history of seizures in the family. No febrile seizures as a child. No meningitis or encephalitis.      She has history of headaches even off of the keppra. On keppra, they are worse. She took topiramate and she didn't have taste on it. She does jerk in her sleep. She did not tolerate keppra due to headaches. We tried changing to zonisamide and she had "bad dreams, depression, and mood swings" with it. We then started lamictal.    Allergies:  Review of patient's allergies indicates:  No Known Allergies    Current Medications:  Current Outpatient Medications   Medication Sig Dispense Refill    gabapentin (NEURONTIN) 300 MG capsule Take 300 mg in the morning and 600 mg at night 90 capsule 11    lamoTRIgine (LAMICTAL) 200 MG tablet Take 1 tablet (200 mg total) by mouth 2 (two) times daily. 60 tablet 11    hydrOXYzine pamoate (VISTARIL) 25 MG Cap 1 pill TID for 2 days, rest q8h prn headaches, no driving/causes sedation (Patient not taking: Reported on 4/28/2022) 15 capsule 1    ibuprofen 100 mg Chew Take by mouth.      tiZANidine (ZANAFLEX) 2 MG tablet 1-2 pills approx 1-2 hours before bed, as needed for muscle pain (Patient not taking: Reported on 4/28/2022) 60 tablet 4     Current Facility-Administered Medications   Medication Dose Route Frequency Provider Last Rate Last Admin    acetaminophen tablet 650 mg  650 mg Oral Once PRN Lizbeth Bland NP        diphenhydrAMINE capsule 25 mg  25 mg Oral Once PRN Lizbeth Bland NP        EPINEPHrine (EPIPEN) 0.3 mg/0.3 mL pen injection 0.3 mg  0.3 mg Intramuscular PRN Lizbeth Bland NP           Past Medical History:  Past Medical History:   Diagnosis Date    Asthma     Headache     Seizures        Past " "Surgical History:  Past Surgical History:   Procedure Laterality Date    OVARIAN CYST DRAINAGE         Family History:  family history includes Brain cancer in her paternal aunt; Breast cancer in her other.    Social History:   reports that she has quit smoking. She smoked 0.25 packs per day. She has never used smokeless tobacco. She reports previous alcohol use. She reports previous drug use.    Physical Exam:  Vitals:    04/28/22 1109   BP: 109/72   Resp: 18   Weight: 117.9 kg (260 lb)   Height: 5' 6" (1.676 m)   PainSc: 0-No pain     Body mass index is 41.97 kg/m².    Neurological Exam:  Mental status: Awake and alert  Speech language: No dysarthria or aphasia on conversation  Cranial nerves: Face symmetric  Motor: Moves all extremities well  Coordination: No ataxia. No tremor.      Data:  I have personally reviewed other provider's notes, labs, & imaging made available to me today.     Labs:  CBC:   Lab Results   Component Value Date    WBC 10.77 03/26/2022    HGB 12.7 03/26/2022    HCT 37.5 03/26/2022     03/26/2022    MCV 88 03/26/2022    RDW 12.8 03/26/2022     BMP:   Lab Results   Component Value Date     03/26/2022    K 3.7 03/26/2022     03/26/2022    CO2 25 03/26/2022    BUN 13 03/26/2022    CREATININE 0.85 03/26/2022    GLU 94 03/26/2022    CALCIUM 9.7 03/26/2022    MG 1.9 03/26/2022     LFTS;   Lab Results   Component Value Date    PROT 7.5 03/26/2022    ALBUMIN 4.5 03/26/2022    BILITOT 0.4 03/26/2022    AST 27 03/26/2022    ALKPHOS 101 03/26/2022    ALT 17 03/26/2022     COAGS: No results found for: INR, PROTIME, PTT  FLP: No results found for: CHOL, HDL, LDLCALC, TRIG, CHOLHDL    I reviewed the CT head from 3/26/22 and find it to show no acute findings    Assessment and Plan:  Ms. Hodge is a 26 y.o. female here for followup of seizures. She is tolerating lacmital well with therapeutic level and seizure-free. Will continue lamictal 200 mg BID. She will continue to followup with " neuro-oncology for monitoring of her glioma.      She will continue to follow with [facility] for migraines.     For her jerking, will check iron studies to see if that is contributing. She notes insurance would not pay for sleep study.      Localization-related (focal) (partial) symptomatic epilepsy and epileptic syndromes with complex partial seizures, not intractable, without status epilepticus    Jerking  -     Ferritin; Future; Expected date: 04/28/2022  -     IRON AND TIBC; Future; Expected date: 04/28/2022    Low grade glioma of brain    Chronic migraine without aura without status migrainosus, not intractable

## 2022-04-29 LAB
FERRITIN SERPL-MCNC: 40 NG/ML (ref 20–300)
IRON SERPL-MCNC: 80 UG/DL (ref 30–160)
SATURATED IRON: 17 % (ref 20–50)
TOTAL IRON BINDING CAPACITY: 459 UG/DL (ref 250–450)
TRANSFERRIN SERPL-MCNC: 310 MG/DL (ref 200–375)

## 2022-05-16 ENCOUNTER — PATIENT MESSAGE (OUTPATIENT)
Dept: NEUROLOGY | Facility: CLINIC | Age: 27
End: 2022-05-16
Payer: MEDICAID

## 2022-05-16 DIAGNOSIS — G40.209 LOCALIZATION-RELATED (FOCAL) (PARTIAL) SYMPTOMATIC EPILEPSY AND EPILEPTIC SYNDROMES WITH COMPLEX PARTIAL SEIZURES, NOT INTRACTABLE, WITHOUT STATUS EPILEPTICUS: Primary | ICD-10-CM

## 2022-05-16 RX ORDER — LAMOTRIGINE 25 MG/1
50 TABLET ORAL 2 TIMES DAILY
Qty: 120 TABLET | Refills: 11 | Status: SHIPPED | OUTPATIENT
Start: 2022-05-16 | End: 2022-07-06 | Stop reason: SDUPTHER

## 2022-05-16 NOTE — TELEPHONE ENCOUNTER
Breakthrough seizure. Will increase lamictal to 250 mg BID. Will check a repeat trough level 1 week after this dose increase.

## 2022-06-07 DIAGNOSIS — M54.50 ACUTE BILATERAL LOW BACK PAIN WITHOUT SCIATICA: Primary | ICD-10-CM

## 2022-06-09 ENCOUNTER — TELEPHONE (OUTPATIENT)
Dept: NEUROLOGY | Facility: CLINIC | Age: 27
End: 2022-06-09
Payer: MEDICAID

## 2022-06-09 NOTE — TELEPHONE ENCOUNTER
----- Message from Jaguar Loomis sent at 6/9/2022  8:55 AM CDT -----  Regarding: sooner appt  Type:  Sooner Appointment Request    Caller is requesting a sooner appointment.      Name of Caller:  Madyson    When is the first available appointment?  No solution found before the template release date of 10/15/2022.    Symptoms:  f/u per neuroOncologist    Best Call Back Number:  [phone]     Additional Information:  needs asap appt

## 2022-06-09 NOTE — TELEPHONE ENCOUNTER
Spoke with patient and she reports she was seen by neuro oncologist on 6/8/22 and things have changed and she is still having seizures. She is requesting appointment with Dr. Rm to discuss. Patient did not elaborate on what had changed and what needed to be discussed.

## 2022-06-10 ENCOUNTER — TELEPHONE (OUTPATIENT)
Dept: NEUROLOGY | Facility: CLINIC | Age: 27
End: 2022-06-10

## 2022-06-10 ENCOUNTER — OFFICE VISIT (OUTPATIENT)
Dept: NEUROLOGY | Facility: CLINIC | Age: 27
End: 2022-06-10
Payer: MEDICAID

## 2022-06-10 VITALS
SYSTOLIC BLOOD PRESSURE: 118 MMHG | DIASTOLIC BLOOD PRESSURE: 76 MMHG | BODY MASS INDEX: 45.21 KG/M2 | WEIGHT: 281.31 LBS | HEART RATE: 71 BPM | HEIGHT: 66 IN

## 2022-06-10 DIAGNOSIS — G40.209 LOCALIZATION-RELATED (FOCAL) (PARTIAL) SYMPTOMATIC EPILEPSY AND EPILEPTIC SYNDROMES WITH COMPLEX PARTIAL SEIZURES, NOT INTRACTABLE, WITHOUT STATUS EPILEPTICUS: Primary | ICD-10-CM

## 2022-06-10 DIAGNOSIS — R41.3 MEMORY LOSS: ICD-10-CM

## 2022-06-10 PROCEDURE — 1159F MED LIST DOCD IN RCRD: CPT | Mod: CPTII,,, | Performed by: PSYCHIATRY & NEUROLOGY

## 2022-06-10 PROCEDURE — 3008F PR BODY MASS INDEX (BMI) DOCUMENTED: ICD-10-PCS | Mod: CPTII,,, | Performed by: PSYCHIATRY & NEUROLOGY

## 2022-06-10 PROCEDURE — 3008F BODY MASS INDEX DOCD: CPT | Mod: CPTII,,, | Performed by: PSYCHIATRY & NEUROLOGY

## 2022-06-10 PROCEDURE — 1159F PR MEDICATION LIST DOCUMENTED IN MEDICAL RECORD: ICD-10-PCS | Mod: CPTII,,, | Performed by: PSYCHIATRY & NEUROLOGY

## 2022-06-10 PROCEDURE — 99214 OFFICE O/P EST MOD 30 MIN: CPT | Mod: PBBFAC,PO | Performed by: PSYCHIATRY & NEUROLOGY

## 2022-06-10 PROCEDURE — 99215 PR OFFICE/OUTPT VISIT, EST, LEVL V, 40-54 MIN: ICD-10-PCS | Mod: S$PBB,,, | Performed by: PSYCHIATRY & NEUROLOGY

## 2022-06-10 PROCEDURE — 3074F SYST BP LT 130 MM HG: CPT | Mod: CPTII,,, | Performed by: PSYCHIATRY & NEUROLOGY

## 2022-06-10 PROCEDURE — 3078F DIAST BP <80 MM HG: CPT | Mod: CPTII,,, | Performed by: PSYCHIATRY & NEUROLOGY

## 2022-06-10 PROCEDURE — 1160F PR REVIEW ALL MEDS BY PRESCRIBER/CLIN PHARMACIST DOCUMENTED: ICD-10-PCS | Mod: CPTII,,, | Performed by: PSYCHIATRY & NEUROLOGY

## 2022-06-10 PROCEDURE — 99215 OFFICE O/P EST HI 40 MIN: CPT | Mod: S$PBB,,, | Performed by: PSYCHIATRY & NEUROLOGY

## 2022-06-10 PROCEDURE — 1160F RVW MEDS BY RX/DR IN RCRD: CPT | Mod: CPTII,,, | Performed by: PSYCHIATRY & NEUROLOGY

## 2022-06-10 PROCEDURE — 99999 PR PBB SHADOW E&M-EST. PATIENT-LVL IV: ICD-10-PCS | Mod: PBBFAC,,, | Performed by: PSYCHIATRY & NEUROLOGY

## 2022-06-10 PROCEDURE — 99999 PR PBB SHADOW E&M-EST. PATIENT-LVL IV: CPT | Mod: PBBFAC,,, | Performed by: PSYCHIATRY & NEUROLOGY

## 2022-06-10 PROCEDURE — 3078F PR MOST RECENT DIASTOLIC BLOOD PRESSURE < 80 MM HG: ICD-10-PCS | Mod: CPTII,,, | Performed by: PSYCHIATRY & NEUROLOGY

## 2022-06-10 PROCEDURE — 3074F PR MOST RECENT SYSTOLIC BLOOD PRESSURE < 130 MM HG: ICD-10-PCS | Mod: CPTII,,, | Performed by: PSYCHIATRY & NEUROLOGY

## 2022-06-10 RX ORDER — NAPROXEN 500 MG/1
500 TABLET ORAL 2 TIMES DAILY
COMMUNITY
Start: 2022-06-10 | End: 2023-09-15
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
## 2022-06-14 ENCOUNTER — PATIENT MESSAGE (OUTPATIENT)
Dept: NEUROLOGY | Facility: CLINIC | Age: 27
End: 2022-06-14
Payer: MEDICAID

## 2022-06-15 ENCOUNTER — PATIENT MESSAGE (OUTPATIENT)
Dept: NEUROLOGY | Facility: CLINIC | Age: 27
End: 2022-06-15
Payer: MEDICAID

## 2022-06-23 ENCOUNTER — TELEPHONE (OUTPATIENT)
Dept: NEUROLOGY | Facility: CLINIC | Age: 27
End: 2022-06-23
Payer: MEDICAID

## 2022-06-24 ENCOUNTER — TELEPHONE (OUTPATIENT)
Dept: NEUROLOGY | Facility: CLINIC | Age: 27
End: 2022-06-24
Payer: MEDICAID

## 2022-06-24 ENCOUNTER — PATIENT MESSAGE (OUTPATIENT)
Dept: NEUROLOGY | Facility: CLINIC | Age: 27
End: 2022-06-24
Payer: MEDICAID

## 2022-06-24 NOTE — TELEPHONE ENCOUNTER
Outside labs reviewed. CMP is normal. Folate, B1, TSH normal.     B12 level is low at 292. I would recommend she take 1000 mcg daily. This may help her symptoms as B12 deficiency is tied to memory trouble, brain and nerve issues, fatigue, etc.     The lamictal level is 5.2 which is in the therapeutic range and not too high or too low. We would have room to increase if we need to (if another seizure occurs).

## 2022-07-06 ENCOUNTER — PATIENT MESSAGE (OUTPATIENT)
Dept: NEUROLOGY | Facility: CLINIC | Age: 27
End: 2022-07-06
Payer: MEDICAID

## 2022-07-06 RX ORDER — GABAPENTIN 300 MG/1
CAPSULE ORAL
Qty: 90 CAPSULE | Refills: 11 | Status: SHIPPED | OUTPATIENT
Start: 2022-07-06 | End: 2023-09-15

## 2022-07-06 RX ORDER — LAMOTRIGINE 25 MG/1
50 TABLET ORAL 2 TIMES DAILY
Qty: 120 TABLET | Refills: 11 | Status: SHIPPED | OUTPATIENT
Start: 2022-07-06 | End: 2023-09-15

## 2022-07-06 RX ORDER — LAMOTRIGINE 200 MG/1
200 TABLET ORAL 2 TIMES DAILY
Qty: 60 TABLET | Refills: 11 | Status: SHIPPED | OUTPATIENT
Start: 2022-07-06 | End: 2023-07-06

## 2022-08-10 ENCOUNTER — PATIENT MESSAGE (OUTPATIENT)
Dept: NEUROLOGY | Facility: CLINIC | Age: 27
End: 2022-08-10
Payer: MEDICAID

## 2023-09-25 PROBLEM — G40.209 LOCALIZATION-RELATED (FOCAL) (PARTIAL) SYMPTOMATIC EPILEPSY AND EPILEPTIC SYNDROMES WITH COMPLEX PARTIAL SEIZURES, NOT INTRACTABLE, WITHOUT STATUS EPILEPTICUS: Status: ACTIVE | Noted: 2023-09-25
